# Patient Record
Sex: MALE | Race: WHITE | NOT HISPANIC OR LATINO | Employment: OTHER | URBAN - NONMETROPOLITAN AREA
[De-identification: names, ages, dates, MRNs, and addresses within clinical notes are randomized per-mention and may not be internally consistent; named-entity substitution may affect disease eponyms.]

---

## 2019-01-09 ENCOUNTER — HOSPITAL ENCOUNTER (OUTPATIENT)
Dept: PHYSICAL THERAPY | Facility: HOSPITAL | Age: 79
Setting detail: THERAPIES SERIES
End: 2019-01-09
Attending: PHYSICIAN ASSISTANT
Payer: COMMERCIAL

## 2019-01-09 PROCEDURE — 97140 MANUAL THERAPY 1/> REGIONS: CPT | Mod: GP

## 2019-01-09 PROCEDURE — 97110 THERAPEUTIC EXERCISES: CPT | Mod: GP

## 2019-01-09 PROCEDURE — 97162 PT EVAL MOD COMPLEX 30 MIN: CPT | Mod: GP

## 2019-01-09 NOTE — PROGRESS NOTES
Initial Physical Therapy Evaluations       Name: Blake Burgos MRN# [id number]   Age: 78 year old YOB: 1940     Date of Consultation: January 9, 2019  Primary care provider: Dominic Head    Referring Physician: Dominic Head  Orders: Eval and Treat  Medical Diagnosis: Cervicalgia  Onset of Illness/Injury: n/a    Reason for PT Visit: Patient is an 77 y/o male who presents with neck pain and bilateral radicular arm/hand pain. Has a long history of headache like symptoms as well, which he feels has been worsening along with his neck pain over the past few months. States that in 1974 - fell from roughly 28 feet and sustained multiple injuries along with a head injury. States that pain started in 1983 in his L arm. 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 Recenty had an x ray taken which showed degenerate changes through his cervical spine. Has tried to use a cervical collar and heat for the neck pain, which hasn't really helped. Only prior surgical history was on his wrist in 1974. States that neck pain level is the worst in the morning. Denies ever using traction for neck pain previously. States that he has tried acupuncture, therapy, and injections for his neck in the past.  Prior Level of Function: Has long history of neck pain and bilateral UE tingling   Pain: 4-10/10     Community Support/Living Environment/Employment History: Patient is retired     Patient/Family Goal: Reduce pain, improve function    Fall Screen:   Have you fallen 2 or more times in the last year? No  Have you fallen and had an injury in the last year? No  Timed up & go:   Is patient a fall risk? No    Past Medical History:   No past medical history on file.    Past Surgical History:  No past surgical history on file.    Medications:   No current outpatient medications on file.     No current facility-administered medications for this encounter.        Imaging: Recent x ray taken of cervical spine on  12/7/18:    Advanced degenerative disc disease C5-6 and and C6-7. Mild degenerative disc disease at C4-5. Multilevel moderate to advanced neural formainal narrowing on the L, greatest at C4-C5    Musculoskeletal Findings:     OBJECTIVE   Observation: Patient presents to department in no acute distress.     Palpation: Has tenderness with palpation to bilateral cervical paraspinals and sub occipital musculature    Posture: Forward head, protracted shoulders   Sitting Posture: Poor  Standing Posture: Fair    Neurological Assessment:   Reflexes:   Biceps and triceps reflexes +2    Myotomes:   Right upper extremity: within functional limits and symmetrical   Left upper extremity: within functional limits and symmetrical       Range of Motion/Strength:   Cervical Spine Range of Motion   Active Motion   Flexion 46    Extension 18  - pain reproduction   Side Bend Right 10    Side Bend Left 10    Rotation Right 52    Rotation Left 67    Right upper extremity range of motion: 123 degrees scaption, IR reach and ER reach test to WNLs  Left upper extremity range of motion: 121 degrees scaption, IR reach and ER reach test to WNLs    Right upper extremity strength: 3+/5 scaption, 3+/5 abduction, 4/5 IR, 3+/5 ER  Left upper extremity strength: 3+/5 scaption, 3+/5 abduction, 4/5 IR, 3+/5 ER  Deep Neck Flexor Endurance Test: 13 seconds prior to substitution    Special Tests:   Cervical Spine Tests  Spurling: R  (-) L (-)  Passive Intervertebral Movement Testing:  Mild tenderness with light central and unilateral PA testing  Median Nerve Tension Test:   Radial Nerve Tension Test:   Ulnar Nerve Tension Test:  AO mobility: Limited passive flexion mobility. Upper cervical spine in extended position       Outcome Measures:   48.89% NDI    Prognosis/Plan of Care: Fair  Appropriate for Physical Therapy Intervention: Yes     GOALS:   Short-term goals:  To be achieved in 2-3 weeks:    Instruct in home program  1.) Patient will be independent  with a short-term home exercise program.  2.) Patient will understand and demonstrate improved posture and techniques such that patient places less strain over cervical spine.  3.) Patient will report a 25% or greater improvement in symptoms in order to allow for increased comfort with activities of daily living.        Long-term goals:  To be achieved in 8 weeks:    Self management of symptoms.  Pain free activities of daily living.  1.) Improve score on Neck Disability Index by 50% to correlate with clinically significant change.  2.) Patient will report a 50% or greater improvement in symptoms in order to allow for increased comfort with activities of daily living.       Discharge goals: Patient will be independent with a home program for self-management of symptoms.      Patient presents today with signs and symptoms consistent of chronic mechanical neck pain with likely facet joint irritation. Therapy today consisted of evaluation, patient education, and therapeutic exercise. Patient may benefit from mechanical traction at next visit. Patient would benefit from continued PT sessions addressing overall pain and dysfunction with use of appropriate interventions.    Treatment plan:  1.) Modalities including ultrasound, electrical stimulation, and mechanical traction as needed for pain management and increasing tissue extensibility  2.) Manual therapy to include cervical and thoracic spine joint and soft tissue mobilization for improved mobility and decreased pain  3.) Therapeutic exercise to consist of cervical stabilization and scapular strengthening and range of motion activities    Treatment Rendered/Intervention:   Evaluation completed as described above followed by discussion of exam findings and plan of care.    Therapeutic exercise: Patient instructed in and demonstrated proper performance of home exercise program consisting of:  Supine chin tuck, seated chin tuck, and scap squeezes    Educated in posture  principles and neutral spine positioning. Patient was instructed in home use of heat and/or ice for pain management      Clinical Impressions:  Criteria for Skilled Therapeutic Intervention Met: Yes  PT Diagnosis: chronic neck pain  Influenced by the following impairments: pain, weakness, postural dysfunction  Functional limitations due to impairment: pain with daily movement, activity, and lifting  Clinical presentation: Stable/Uncomplicated  Clinical presentation rationale: Clinical judgement  Clinical Decision making (complexity): Moderate Complexity  Predicted Duration of Therapy Intervention (days/wks): 8 weeks  Risks and Benefits of therapy have been explained: Yes  Patient, Family & other staff in agreement with plan of care: Yes  Comments:       Total Evaluation Time: 55 minutes

## 2019-01-11 ENCOUNTER — HOSPITAL ENCOUNTER (OUTPATIENT)
Dept: PHYSICAL THERAPY | Facility: HOSPITAL | Age: 79
Setting detail: THERAPIES SERIES
End: 2019-01-11
Attending: PHYSICIAN ASSISTANT
Payer: COMMERCIAL

## 2019-01-11 PROCEDURE — 97140 MANUAL THERAPY 1/> REGIONS: CPT | Mod: GP

## 2019-01-11 PROCEDURE — 97110 THERAPEUTIC EXERCISES: CPT | Mod: GP

## 2019-01-15 ENCOUNTER — HOSPITAL ENCOUNTER (OUTPATIENT)
Dept: PHYSICAL THERAPY | Facility: HOSPITAL | Age: 79
Setting detail: THERAPIES SERIES
End: 2019-01-15
Attending: PHYSICIAN ASSISTANT
Payer: COMMERCIAL

## 2019-01-15 PROCEDURE — 97110 THERAPEUTIC EXERCISES: CPT | Mod: GP

## 2019-01-15 PROCEDURE — 97140 MANUAL THERAPY 1/> REGIONS: CPT | Mod: GP

## 2019-01-22 ENCOUNTER — HOSPITAL ENCOUNTER (OUTPATIENT)
Dept: PHYSICAL THERAPY | Facility: HOSPITAL | Age: 79
Setting detail: THERAPIES SERIES
End: 2019-01-22
Attending: PHYSICIAN ASSISTANT
Payer: COMMERCIAL

## 2019-01-22 PROCEDURE — 97140 MANUAL THERAPY 1/> REGIONS: CPT | Mod: GP

## 2019-01-22 PROCEDURE — 97110 THERAPEUTIC EXERCISES: CPT | Mod: GP

## 2019-02-05 ENCOUNTER — HOSPITAL ENCOUNTER (OUTPATIENT)
Dept: PHYSICAL THERAPY | Facility: HOSPITAL | Age: 79
Setting detail: THERAPIES SERIES
End: 2019-02-05
Attending: PHYSICIAN ASSISTANT
Payer: COMMERCIAL

## 2019-02-05 PROCEDURE — 97110 THERAPEUTIC EXERCISES: CPT | Mod: GP

## 2019-02-05 PROCEDURE — 97140 MANUAL THERAPY 1/> REGIONS: CPT | Mod: GP

## 2019-02-11 ENCOUNTER — HOSPITAL ENCOUNTER (OUTPATIENT)
Dept: PHYSICAL THERAPY | Facility: HOSPITAL | Age: 79
Setting detail: THERAPIES SERIES
End: 2019-02-11
Attending: PHYSICIAN ASSISTANT
Payer: COMMERCIAL

## 2019-02-11 PROCEDURE — 97140 MANUAL THERAPY 1/> REGIONS: CPT | Mod: GP

## 2019-02-11 PROCEDURE — 97110 THERAPEUTIC EXERCISES: CPT | Mod: GP

## 2019-05-09 ENCOUNTER — HOSPITAL ENCOUNTER (OUTPATIENT)
Dept: MRI IMAGING | Facility: HOSPITAL | Age: 79
Discharge: HOME | End: 2019-05-09
Attending: PHYSICIAN ASSISTANT | Admitting: PHYSICIAN ASSISTANT
Payer: COMMERCIAL

## 2019-05-09 ENCOUNTER — HOSPITAL ENCOUNTER (OUTPATIENT)
Dept: MRI IMAGING | Facility: HOSPITAL | Age: 79
End: 2019-05-09
Attending: PHYSICIAN ASSISTANT
Payer: COMMERCIAL

## 2019-05-09 DIAGNOSIS — M54.50 LOW BACK PAIN: ICD-10-CM

## 2019-05-09 DIAGNOSIS — M54.2 CERVICALGIA: ICD-10-CM

## 2019-05-09 PROCEDURE — 72148 MRI LUMBAR SPINE W/O DYE: CPT | Mod: TC

## 2019-05-09 PROCEDURE — 72141 MRI NECK SPINE W/O DYE: CPT | Mod: TC

## 2019-08-12 ENCOUNTER — HOSPITAL ENCOUNTER (OUTPATIENT)
Dept: MRI IMAGING | Facility: OTHER | Age: 79
Discharge: HOME | End: 2019-08-12
Attending: NEUROLOGICAL SURGERY | Admitting: FAMILY MEDICINE
Payer: COMMERCIAL

## 2019-08-12 DIAGNOSIS — R51.9 CHRONIC HEADACHE: ICD-10-CM

## 2019-08-12 DIAGNOSIS — G89.29 CHRONIC HEADACHE: ICD-10-CM

## 2019-08-12 PROCEDURE — 70551 MRI BRAIN STEM W/O DYE: CPT

## 2020-08-26 ENCOUNTER — HOSPITAL ENCOUNTER (OUTPATIENT)
Dept: ULTRASOUND IMAGING | Facility: OTHER | Age: 80
Discharge: HOME | End: 2020-08-26
Attending: PHYSICIAN ASSISTANT | Admitting: FAMILY MEDICINE
Payer: COMMERCIAL

## 2020-08-26 DIAGNOSIS — I65.29 STENOSIS OF CAROTID ARTERY, UNSPECIFIED LATERALITY: ICD-10-CM

## 2020-08-26 PROCEDURE — 93880 EXTRACRANIAL BILAT STUDY: CPT

## 2022-01-01 ENCOUNTER — MYC MEDICAL ADVICE (OUTPATIENT)
Dept: INTERNAL MEDICINE | Facility: OTHER | Age: 82
End: 2022-01-01
Payer: MEDICARE

## 2022-01-01 ENCOUNTER — ALLIED HEALTH/NURSE VISIT (OUTPATIENT)
Dept: FAMILY MEDICINE | Facility: OTHER | Age: 82
End: 2022-01-01
Attending: INTERNAL MEDICINE
Payer: COMMERCIAL

## 2022-01-01 ENCOUNTER — HEALTH MAINTENANCE LETTER (OUTPATIENT)
Age: 82
End: 2022-01-01

## 2022-01-01 ENCOUNTER — OFFICE VISIT (OUTPATIENT)
Dept: INTERNAL MEDICINE | Facility: OTHER | Age: 82
End: 2022-01-01
Attending: INTERNAL MEDICINE
Payer: COMMERCIAL

## 2022-01-01 ENCOUNTER — APPOINTMENT (OUTPATIENT)
Dept: CT IMAGING | Facility: OTHER | Age: 82
End: 2022-01-01
Attending: FAMILY MEDICINE
Payer: MEDICARE

## 2022-01-01 ENCOUNTER — TRANSFERRED RECORDS (OUTPATIENT)
Dept: HEALTH INFORMATION MANAGEMENT | Facility: OTHER | Age: 82
End: 2022-01-01
Payer: MEDICARE

## 2022-01-01 ENCOUNTER — HOSPITAL ENCOUNTER (OUTPATIENT)
Dept: RESPIRATORY THERAPY | Facility: OTHER | Age: 82
Discharge: HOME | End: 2022-03-07
Attending: INTERNAL MEDICINE | Admitting: INTERNAL MEDICINE
Payer: COMMERCIAL

## 2022-01-01 ENCOUNTER — APPOINTMENT (OUTPATIENT)
Dept: GENERAL RADIOLOGY | Facility: OTHER | Age: 82
End: 2022-01-01
Attending: FAMILY MEDICINE
Payer: MEDICARE

## 2022-01-01 ENCOUNTER — HOSPITAL ENCOUNTER (OUTPATIENT)
Dept: CT IMAGING | Facility: OTHER | Age: 82
End: 2022-03-07
Attending: INTERNAL MEDICINE
Payer: COMMERCIAL

## 2022-01-01 ENCOUNTER — OFFICE VISIT (OUTPATIENT)
Dept: INTERNAL MEDICINE | Facility: OTHER | Age: 82
End: 2022-01-01
Attending: INTERNAL MEDICINE
Payer: MEDICARE

## 2022-01-01 ENCOUNTER — HOSPITAL ENCOUNTER (OUTPATIENT)
Dept: RESPIRATORY THERAPY | Facility: OTHER | Age: 82
End: 2022-03-07
Attending: INTERNAL MEDICINE
Payer: COMMERCIAL

## 2022-01-01 ENCOUNTER — LAB (OUTPATIENT)
Dept: LAB | Facility: OTHER | Age: 82
End: 2022-01-01
Payer: COMMERCIAL

## 2022-01-01 ENCOUNTER — HOSPITAL ENCOUNTER (EMERGENCY)
Facility: OTHER | Age: 82
Discharge: ANOTHER HOSPITAL | End: 2022-05-30
Attending: FAMILY MEDICINE | Admitting: FAMILY MEDICINE
Payer: MEDICARE

## 2022-01-01 VITALS
HEART RATE: 101 BPM | OXYGEN SATURATION: 92 % | SYSTOLIC BLOOD PRESSURE: 138 MMHG | TEMPERATURE: 97.7 F | DIASTOLIC BLOOD PRESSURE: 88 MMHG | DIASTOLIC BLOOD PRESSURE: 78 MMHG | HEART RATE: 84 BPM | TEMPERATURE: 98.1 F | SYSTOLIC BLOOD PRESSURE: 162 MMHG | RESPIRATION RATE: 24 BRPM | WEIGHT: 126 LBS | WEIGHT: 127.6 LBS | OXYGEN SATURATION: 86 % | RESPIRATION RATE: 18 BRPM

## 2022-01-01 VITALS
TEMPERATURE: 97.4 F | DIASTOLIC BLOOD PRESSURE: 78 MMHG | HEART RATE: 93 BPM | WEIGHT: 128.6 LBS | OXYGEN SATURATION: 90 % | RESPIRATION RATE: 24 BRPM | SYSTOLIC BLOOD PRESSURE: 148 MMHG

## 2022-01-01 VITALS
RESPIRATION RATE: 17 BRPM | OXYGEN SATURATION: 100 % | DIASTOLIC BLOOD PRESSURE: 57 MMHG | TEMPERATURE: 94.82 F | SYSTOLIC BLOOD PRESSURE: 106 MMHG | HEART RATE: 83 BPM

## 2022-01-01 DIAGNOSIS — J44.9 CHRONIC OBSTRUCTIVE PULMONARY DISEASE, UNSPECIFIED COPD TYPE (H): ICD-10-CM

## 2022-01-01 DIAGNOSIS — D47.1 MYELOPROLIFERATIVE DISORDER (H): ICD-10-CM

## 2022-01-01 DIAGNOSIS — J43.9 PULMONARY EMPHYSEMA, UNSPECIFIED EMPHYSEMA TYPE (H): ICD-10-CM

## 2022-01-01 DIAGNOSIS — Z87.09 H/O ASBESTOSIS: ICD-10-CM

## 2022-01-01 DIAGNOSIS — J43.9 PULMONARY EMPHYSEMA, UNSPECIFIED EMPHYSEMA TYPE (H): Primary | ICD-10-CM

## 2022-01-01 DIAGNOSIS — N18.32 STAGE 3B CHRONIC KIDNEY DISEASE (H): ICD-10-CM

## 2022-01-01 DIAGNOSIS — I73.9 PERIPHERAL ARTERY DISEASE (H): ICD-10-CM

## 2022-01-01 DIAGNOSIS — I46.9 CARDIOPULMONARY ARREST (H): ICD-10-CM

## 2022-01-01 DIAGNOSIS — Z20.822 COVID-19 RULED OUT: Primary | ICD-10-CM

## 2022-01-01 DIAGNOSIS — R91.8 PULMONARY NODULES: ICD-10-CM

## 2022-01-01 DIAGNOSIS — R91.8 PULMONARY NODULES: Primary | ICD-10-CM

## 2022-01-01 DIAGNOSIS — E78.49 OTHER HYPERLIPIDEMIA: ICD-10-CM

## 2022-01-01 LAB
6 MIN WALK (FT): NORMAL
6 MIN WALK (M): NORMAL
ALBUMIN SERPL-MCNC: 3.7 G/DL (ref 3.5–5.7)
ALBUMIN UR-MCNC: 300 MG/DL
ALP SERPL-CCNC: 85 U/L (ref 34–104)
ALT SERPL W P-5'-P-CCNC: 10 U/L (ref 7–52)
AMMONIA PLAS-SCNC: 63 UMOL/L (ref 16–53)
ANION GAP SERPL CALCULATED.3IONS-SCNC: 14 MMOL/L (ref 3–14)
ANION GAP SERPL CALCULATED.3IONS-SCNC: 6 MMOL/L (ref 3–14)
APPEARANCE UR: ABNORMAL
APTT PPP: 35 SECONDS (ref 22–38)
AST SERPL W P-5'-P-CCNC: 14 U/L (ref 13–39)
ATRIAL RATE - MUSE: 77 BPM
BACTERIA UR CULT: NO GROWTH
BASE EXCESS BLDA CALC-SCNC: -11 MMOL/L (ref -9–1.8)
BASOPHILS # BLD AUTO: 0.1 10E3/UL (ref 0–0.2)
BASOPHILS # BLD AUTO: 0.1 10E3/UL (ref 0–0.2)
BASOPHILS NFR BLD AUTO: 1 %
BASOPHILS NFR BLD AUTO: 2 %
BILIRUB SERPL-MCNC: 0.4 MG/DL (ref 0.3–1)
BILIRUB UR QL STRIP: NEGATIVE
BUN SERPL-MCNC: 27 MG/DL (ref 7–25)
BUN SERPL-MCNC: 30 MG/DL (ref 7–25)
CALCIUM SERPL-MCNC: 7.9 MG/DL (ref 8.6–10.3)
CALCIUM SERPL-MCNC: 9.1 MG/DL (ref 8.6–10.3)
CHLORIDE BLD-SCNC: 106 MMOL/L (ref 98–107)
CHLORIDE BLD-SCNC: 107 MMOL/L (ref 98–107)
CO2 SERPL-SCNC: 20 MMOL/L (ref 21–31)
CO2 SERPL-SCNC: 27 MMOL/L (ref 21–31)
COLOR UR AUTO: YELLOW
CREAT SERPL-MCNC: 1.92 MG/DL (ref 0.7–1.3)
CREAT SERPL-MCNC: 2.06 MG/DL (ref 0.7–1.3)
D DIMER PPP FEU-MCNC: 7.77 UG/ML FEU (ref 0–0.5)
DIASTOLIC BLOOD PRESSURE - MUSE: NORMAL MMHG
EOSINOPHIL # BLD AUTO: 0.2 10E3/UL (ref 0–0.7)
EOSINOPHIL # BLD AUTO: 0.3 10E3/UL (ref 0–0.7)
EOSINOPHIL NFR BLD AUTO: 3 %
EOSINOPHIL NFR BLD AUTO: 4 %
ERYTHROCYTE [DISTWIDTH] IN BLOOD BY AUTOMATED COUNT: 15 % (ref 10–15)
ERYTHROCYTE [DISTWIDTH] IN BLOOD BY AUTOMATED COUNT: 16.4 % (ref 10–15)
ETHANOL SERPL-MCNC: <0.01 G/DL
FLUAV RNA SPEC QL NAA+PROBE: NEGATIVE
FLUBV RNA RESP QL NAA+PROBE: NEGATIVE
GFR SERPL CREATININE-BSD FRML MDRD: 31 ML/MIN/1.73M2
GFR SERPL CREATININE-BSD FRML MDRD: 34 ML/MIN/1.73M2
GLUCOSE BLD-MCNC: 217 MG/DL (ref 70–105)
GLUCOSE BLD-MCNC: 98 MG/DL (ref 70–105)
GLUCOSE UR STRIP-MCNC: 70 MG/DL
HCO3 BLD-SCNC: 18 MMOL/L (ref 21–28)
HCT VFR BLD AUTO: 40.9 % (ref 40–53)
HCT VFR BLD AUTO: 42.2 % (ref 40–53)
HGB BLD-MCNC: 12.2 G/DL (ref 13.3–17.7)
HGB BLD-MCNC: 13.6 G/DL (ref 13.3–17.7)
HGB UR QL STRIP: ABNORMAL
HYALINE CASTS: 6 /LPF
IMM GRANULOCYTES # BLD: 0 10E3/UL
IMM GRANULOCYTES # BLD: 0.3 10E3/UL
IMM GRANULOCYTES NFR BLD: 0 %
IMM GRANULOCYTES NFR BLD: 4 %
INR PPP: 1.18 (ref 0.85–1.15)
INTERPRETATION ECG - MUSE: NORMAL
KETONES UR STRIP-MCNC: NEGATIVE MG/DL
LACTATE SERPL-SCNC: 8.9 MMOL/L (ref 0.7–2)
LEUKOCYTE ESTERASE UR QL STRIP: NEGATIVE
LYMPHOCYTES # BLD AUTO: 0.8 10E3/UL (ref 0.8–5.3)
LYMPHOCYTES # BLD AUTO: 2.4 10E3/UL (ref 0.8–5.3)
LYMPHOCYTES NFR BLD AUTO: 14 %
LYMPHOCYTES NFR BLD AUTO: 39 %
MCH RBC QN AUTO: 33.2 PG (ref 26.5–33)
MCH RBC QN AUTO: 33.6 PG (ref 26.5–33)
MCHC RBC AUTO-ENTMCNC: 29.8 G/DL (ref 31.5–36.5)
MCHC RBC AUTO-ENTMCNC: 32.2 G/DL (ref 31.5–36.5)
MCV RBC AUTO: 104 FL (ref 78–100)
MCV RBC AUTO: 111 FL (ref 78–100)
MONOCYTES # BLD AUTO: 0.5 10E3/UL (ref 0–1.3)
MONOCYTES # BLD AUTO: 0.6 10E3/UL (ref 0–1.3)
MONOCYTES NFR BLD AUTO: 10 %
MONOCYTES NFR BLD AUTO: 9 %
MUCOUS THREADS #/AREA URNS LPF: PRESENT /LPF
NEUTROPHILS # BLD AUTO: 2.6 10E3/UL (ref 1.6–8.3)
NEUTROPHILS # BLD AUTO: 4.4 10E3/UL (ref 1.6–8.3)
NEUTROPHILS NFR BLD AUTO: 43 %
NEUTROPHILS NFR BLD AUTO: 71 %
NITRATE UR QL: NEGATIVE
NRBC # BLD AUTO: 0 10E3/UL
NRBC # BLD AUTO: 0 10E3/UL
NRBC BLD AUTO-RTO: 0 /100
NRBC BLD AUTO-RTO: 0 /100
O2/TOTAL GAS SETTING VFR VENT: 100 %
OXYHGB MFR BLD: 96 % (ref 92–100)
P AXIS - MUSE: 83 DEGREES
PCO2 BLD: 54 MM HG (ref 35–45)
PH BLD: 7.13 [PH] (ref 7.35–7.45)
PH UR STRIP: 6.5 [PH] (ref 5–9)
PLATELET # BLD AUTO: 210 10E3/UL (ref 150–450)
PLATELET # BLD AUTO: 360 10E3/UL (ref 150–450)
PO2 BLD: >488 MM HG (ref 80–105)
POTASSIUM BLD-SCNC: 4.9 MMOL/L (ref 3.5–5.1)
POTASSIUM BLD-SCNC: 5.4 MMOL/L (ref 3.5–5.1)
PR INTERVAL - MUSE: 158 MS
PROT SERPL-MCNC: 6.8 G/DL (ref 6.4–8.9)
QRS DURATION - MUSE: 136 MS
QT - MUSE: 508 MS
QTC - MUSE: 574 MS
R AXIS - MUSE: 97 DEGREES
RBC # BLD AUTO: 3.68 10E6/UL (ref 4.4–5.9)
RBC # BLD AUTO: 4.05 10E6/UL (ref 4.4–5.9)
RBC URINE: >182 /HPF
RSV RNA SPEC NAA+PROBE: NEGATIVE
SARS-COV-2 RNA RESP QL NAA+PROBE: NEGATIVE
SARS-COV-2 RNA RESP QL NAA+PROBE: NEGATIVE
SODIUM SERPL-SCNC: 140 MMOL/L (ref 134–144)
SODIUM SERPL-SCNC: 140 MMOL/L (ref 134–144)
SP GR UR STRIP: 1.02 (ref 1–1.03)
SQUAMOUS EPITHELIAL: 2 /HPF
SYSTOLIC BLOOD PRESSURE - MUSE: NORMAL MMHG
T AXIS - MUSE: 60 DEGREES
TROPONIN I SERPL-MCNC: 33.7 PG/ML (ref 0–34)
UROBILINOGEN UR STRIP-MCNC: NORMAL MG/DL
VENTRICULAR RATE- MUSE: 77 BPM
WBC # BLD AUTO: 6.1 10E3/UL (ref 4–11)
WBC # BLD AUTO: 6.2 10E3/UL (ref 4–11)
WBC URINE: 41 /HPF

## 2022-01-01 PROCEDURE — 99213 OFFICE O/P EST LOW 20 MIN: CPT | Performed by: INTERNAL MEDICINE

## 2022-01-01 PROCEDURE — 92950 HEART/LUNG RESUSCITATION CPR: CPT | Performed by: FAMILY MEDICINE

## 2022-01-01 PROCEDURE — 84484 ASSAY OF TROPONIN QUANT: CPT | Performed by: FAMILY MEDICINE

## 2022-01-01 PROCEDURE — 250N000009 HC RX 250: Performed by: FAMILY MEDICINE

## 2022-01-01 PROCEDURE — C9803 HOPD COVID-19 SPEC COLLECT: HCPCS

## 2022-01-01 PROCEDURE — 82140 ASSAY OF AMMONIA: CPT | Performed by: FAMILY MEDICINE

## 2022-01-01 PROCEDURE — 36415 COLL VENOUS BLD VENIPUNCTURE: CPT | Performed by: FAMILY MEDICINE

## 2022-01-01 PROCEDURE — 999N000157 HC STATISTIC RCP TIME EA 10 MIN

## 2022-01-01 PROCEDURE — 51702 INSERT TEMP BLADDER CATH: CPT | Mod: XU | Performed by: FAMILY MEDICINE

## 2022-01-01 PROCEDURE — 94618 PULMONARY STRESS TESTING: CPT

## 2022-01-01 PROCEDURE — 94060 EVALUATION OF WHEEZING: CPT

## 2022-01-01 PROCEDURE — 36600 WITHDRAWAL OF ARTERIAL BLOOD: CPT | Performed by: FAMILY MEDICINE

## 2022-01-01 PROCEDURE — 94729 DIFFUSING CAPACITY: CPT | Mod: 26 | Performed by: INTERNAL MEDICINE

## 2022-01-01 PROCEDURE — 85379 FIBRIN DEGRADATION QUANT: CPT | Performed by: FAMILY MEDICINE

## 2022-01-01 PROCEDURE — 81001 URINALYSIS AUTO W/SCOPE: CPT | Performed by: FAMILY MEDICINE

## 2022-01-01 PROCEDURE — 999N000158 HC STATISTIC RCP TIME ED VENT EA 10 MIN

## 2022-01-01 PROCEDURE — 94002 VENT MGMT INPAT INIT DAY: CPT

## 2022-01-01 PROCEDURE — 999N000105 HC STATISTIC NO DOCUMENTATION TO SUPPORT CHARGE

## 2022-01-01 PROCEDURE — 99285 EMERGENCY DEPT VISIT HI MDM: CPT | Mod: 25 | Performed by: FAMILY MEDICINE

## 2022-01-01 PROCEDURE — 258N000003 HC RX IP 258 OP 636: Performed by: FAMILY MEDICINE

## 2022-01-01 PROCEDURE — 94618 PULMONARY STRESS TESTING: CPT | Mod: 26 | Performed by: INTERNAL MEDICINE

## 2022-01-01 PROCEDURE — 99291 CRITICAL CARE FIRST HOUR: CPT | Mod: CS | Performed by: FAMILY MEDICINE

## 2022-01-01 PROCEDURE — 80048 BASIC METABOLIC PNL TOTAL CA: CPT | Performed by: FAMILY MEDICINE

## 2022-01-01 PROCEDURE — C9803 HOPD COVID-19 SPEC COLLECT: HCPCS | Performed by: FAMILY MEDICINE

## 2022-01-01 PROCEDURE — 87086 URINE CULTURE/COLONY COUNT: CPT | Performed by: FAMILY MEDICINE

## 2022-01-01 PROCEDURE — 83605 ASSAY OF LACTIC ACID: CPT | Performed by: FAMILY MEDICINE

## 2022-01-01 PROCEDURE — 87637 SARSCOV2&INF A&B&RSV AMP PRB: CPT | Performed by: FAMILY MEDICINE

## 2022-01-01 PROCEDURE — G1004 CDSM NDSC: HCPCS

## 2022-01-01 PROCEDURE — 72125 CT NECK SPINE W/O DYE: CPT | Mod: MG

## 2022-01-01 PROCEDURE — 85610 PROTHROMBIN TIME: CPT | Performed by: FAMILY MEDICINE

## 2022-01-01 PROCEDURE — 85730 THROMBOPLASTIN TIME PARTIAL: CPT | Mod: GZ | Performed by: FAMILY MEDICINE

## 2022-01-01 PROCEDURE — 85025 COMPLETE CBC W/AUTO DIFF WBC: CPT | Mod: ZL

## 2022-01-01 PROCEDURE — 94729 DIFFUSING CAPACITY: CPT | Mod: 26

## 2022-01-01 PROCEDURE — 82805 BLOOD GASES W/O2 SATURATION: CPT | Performed by: FAMILY MEDICINE

## 2022-01-01 PROCEDURE — 94060 EVALUATION OF WHEEZING: CPT | Mod: 26 | Performed by: INTERNAL MEDICINE

## 2022-01-01 PROCEDURE — 94200 LUNG FUNCTION TEST (MBC/MVV): CPT

## 2022-01-01 PROCEDURE — 85025 COMPLETE CBC W/AUTO DIFF WBC: CPT | Performed by: FAMILY MEDICINE

## 2022-01-01 PROCEDURE — 93005 ELECTROCARDIOGRAM TRACING: CPT | Performed by: FAMILY MEDICINE

## 2022-01-01 PROCEDURE — 71250 CT THORAX DX C-: CPT

## 2022-01-01 PROCEDURE — 96365 THER/PROPH/DIAG IV INF INIT: CPT | Mod: XU | Performed by: FAMILY MEDICINE

## 2022-01-01 PROCEDURE — 36415 COLL VENOUS BLD VENIPUNCTURE: CPT | Mod: ZL

## 2022-01-01 PROCEDURE — 999N000065 XR CHEST PORT 1 VIEW

## 2022-01-01 PROCEDURE — U0003 INFECTIOUS AGENT DETECTION BY NUCLEIC ACID (DNA OR RNA); SEVERE ACUTE RESPIRATORY SYNDROME CORONAVIRUS 2 (SARS-COV-2) (CORONAVIRUS DISEASE [COVID-19]), AMPLIFIED PROBE TECHNIQUE, MAKING USE OF HIGH THROUGHPUT TECHNOLOGIES AS DESCRIBED BY CMS-2020-01-R: HCPCS | Mod: ZL

## 2022-01-01 PROCEDURE — 250N000009 HC RX 250

## 2022-01-01 PROCEDURE — 82077 ASSAY SPEC XCP UR&BREATH IA: CPT | Performed by: FAMILY MEDICINE

## 2022-01-01 PROCEDURE — 93010 ELECTROCARDIOGRAM REPORT: CPT | Performed by: INTERNAL MEDICINE

## 2022-01-01 PROCEDURE — 71250 CT THORAX DX C-: CPT | Mod: MG

## 2022-01-01 PROCEDURE — 80051 ELECTROLYTE PANEL: CPT | Mod: ZL

## 2022-01-01 PROCEDURE — 250N000011 HC RX IP 250 OP 636: Performed by: FAMILY MEDICINE

## 2022-01-01 PROCEDURE — G0463 HOSPITAL OUTPT CLINIC VISIT: HCPCS

## 2022-01-01 PROCEDURE — 99204 OFFICE O/P NEW MOD 45 MIN: CPT | Performed by: INTERNAL MEDICINE

## 2022-01-01 RX ORDER — ALBUTEROL SULFATE 0.83 MG/ML
2.5 SOLUTION RESPIRATORY (INHALATION)
Status: COMPLETED | OUTPATIENT
Start: 2022-01-01 | End: 2022-01-01

## 2022-01-01 RX ORDER — ALBUTEROL SULFATE 90 UG/1
2 AEROSOL, METERED RESPIRATORY (INHALATION) EVERY 6 HOURS
Qty: 18 G | COMMUNITY
Start: 2022-01-01

## 2022-01-01 RX ORDER — HYDROXYUREA 500 MG/1
CAPSULE ORAL
COMMUNITY
Start: 2022-01-01

## 2022-01-01 RX ORDER — TAMSULOSIN HYDROCHLORIDE 0.4 MG/1
0.4 CAPSULE ORAL DAILY
COMMUNITY
Start: 2022-01-01

## 2022-01-01 RX ORDER — METOPROLOL TARTRATE 25 MG/1
12.5 TABLET, FILM COATED ORAL 2 TIMES DAILY
COMMUNITY
Start: 2022-01-01

## 2022-01-01 RX ORDER — ASPIRIN 325 MG
325 TABLET, DELAYED RELEASE (ENTERIC COATED) ORAL DAILY
COMMUNITY
Start: 2022-01-01

## 2022-01-01 RX ORDER — TIOTROPIUM BROMIDE 18 UG/1
18 CAPSULE ORAL; RESPIRATORY (INHALATION) DAILY
COMMUNITY
Start: 2022-01-01

## 2022-01-01 RX ORDER — ATORVASTATIN CALCIUM 80 MG/1
80 TABLET, FILM COATED ORAL DAILY
COMMUNITY
Start: 2022-01-01

## 2022-01-01 RX ADMIN — ALBUTEROL SULFATE 2.5 MG: 2.5 SOLUTION RESPIRATORY (INHALATION) at 15:22

## 2022-01-01 RX ADMIN — EPINEPHRINE 0.03 MCG/KG/MIN: 1 INJECTION INTRAMUSCULAR; INTRAVENOUS; SUBCUTANEOUS at 10:47

## 2022-01-01 RX ADMIN — Medication 80 MG: at 10:56

## 2022-01-01 RX ADMIN — ROCURONIUM BROMIDE 50 MG: 50 INJECTION, SOLUTION INTRAVENOUS at 10:56

## 2022-01-01 ASSESSMENT — PAIN SCALES - GENERAL
PAINLEVEL: NO PAIN (0)

## 2022-01-01 ASSESSMENT — ENCOUNTER SYMPTOMS
EYES NEGATIVE: 1
HEMATOLOGIC/LYMPHATIC NEGATIVE: 1
CONSTITUTIONAL NEGATIVE: 1
ALLERGIC/IMMUNOLOGIC NEGATIVE: 1
ENDOCRINE NEGATIVE: 1
ALLERGIC/IMMUNOLOGIC NEGATIVE: 1
CONSTITUTIONAL NEGATIVE: 1
EYES NEGATIVE: 1
ALLERGIC/IMMUNOLOGIC NEGATIVE: 1
ENDOCRINE NEGATIVE: 1
CONSTITUTIONAL NEGATIVE: 1
ENDOCRINE NEGATIVE: 1

## 2022-02-21 PROBLEM — I73.9 PERIPHERAL ARTERY DISEASE (H): Status: ACTIVE | Noted: 2022-01-01

## 2022-02-21 PROBLEM — J44.9 COPD (CHRONIC OBSTRUCTIVE PULMONARY DISEASE) (H): Status: ACTIVE | Noted: 2022-01-01

## 2022-02-21 PROBLEM — R39.9 LOWER URINARY TRACT SYMPTOMS (LUTS): Status: ACTIVE | Noted: 2022-01-01

## 2022-02-21 PROBLEM — D47.1 MYELOPROLIFERATIVE DISORDER (H): Status: ACTIVE | Noted: 2022-01-01

## 2022-02-21 PROBLEM — N18.32 STAGE 3B CHRONIC KIDNEY DISEASE (H): Status: ACTIVE | Noted: 2022-01-01

## 2022-02-21 PROBLEM — Z87.09 H/O ASBESTOSIS: Status: ACTIVE | Noted: 2022-01-01

## 2022-02-21 PROBLEM — E78.49 OTHER HYPERLIPIDEMIA: Status: ACTIVE | Noted: 2022-01-01

## 2022-02-21 NOTE — PROGRESS NOTES
Chief Complaint:  Establish care.    HPI: This patient is here today to establish care.  He has an extensive past medical history and I took some time today to update that.  His biggest problem is related to smoking history.  He likely has severe COPD.  He also has a history of asbestos exposure and asbestosis on previous imaging.  He complains of being short of breath with doing pretty much anything.  Sometimes his oxygen saturations are low at home, especially when he has a COPD exacerbation.  About a month ago he had a COPD exacerbation and was treated with prednisone and doxycycline with good results.    I reconciled his medications today and noted them on his medication list.  He is taking his Spiriva twice daily and his Wixela once daily.  I told him that he needed to change those dosing schedules.  Otherwise his medications seem to be acceptable.    [facility]  [facility] for him to get [facility] so apparently they are approving him to come here for further evaluation and care.  As mentioned, medication list is updated.  Past medical history, past surgical history, family history and social histories are reviewed and updated.  He does have a history of a myeloproliferative disorder for which he takes Hydrea, the exact diagnosis is unclear as he is uncertain.    He is accompanied today by his daughter Sima.    Past Medical History:   Diagnosis Date     Bilateral carotid artery stenosis     50%     Chronic neck pain      COPD (chronic obstructive pulmonary disease) (H)      H/O asbestosis      Lower urinary tract symptoms (LUTS)      Myeloproliferative disorder (H)      Other hyperlipidemia      Peripheral artery disease (H)      Polycythemia      Stage 3b chronic kidney disease (H)        Past Surgical History:   Procedure Laterality Date     BYPASS GRAFT Left      HERNIA REPAIR Left      ORIF WRIST FRACTURE Right        Allergies   Allergen Reactions     Nortriptyline Unknown     Oxycodone  Itching       Current Outpatient Medications   Medication Sig Dispense Refill     albuterol (PROAIR HFA/PROVENTIL HFA/VENTOLIN HFA) 108 (90 Base) MCG/ACT inhaler Inhale 2 puffs into the lungs every 6 hours 18 g      aspirin (ASA) 325 MG EC tablet Take 1 tablet (325 mg) by mouth daily       atorvastatin (LIPITOR) 80 MG tablet Take 1 tablet (80 mg) by mouth daily       fluticasone-salmeterol (ADVAIR) 250-50 MCG/DOSE inhaler Inhale 1 puff into the lungs every 12 hours       hydroxyurea (HYDREA) 500 MG capsule        metoprolol tartrate (LOPRESSOR) 25 MG tablet Take 0.5 tablets (12.5 mg) by mouth 2 times daily       tamsulosin (FLOMAX) 0.4 MG capsule Take 1 capsule (0.4 mg) by mouth daily       tiotropium (SPIRIVA) 18 MCG inhaled capsule Inhale 1 capsule (18 mcg) into the lungs daily         Social History     Socioeconomic History     Marital status:      Spouse name: Not on file     Number of children: Not on file     Years of education: Not on file     Highest education level: Not on file   Occupational History     Not on file   Tobacco Use     Smoking status: Current Every Day Smoker     Packs/day: 0.50     Smokeless tobacco: Never Used   Substance and Sexual Activity     Alcohol use: Not Currently     Drug use: Never     Sexual activity: Not on file   Other Topics Concern     Not on file   Social History Narrative    , lives near [facility].  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     Social Determinants of Health     Financial Resource Strain: Not on file   Food Insecurity: Not on file   Transportation Needs: Not on file   Physical Activity: Not on file   Stress: Not on file   Social Connections: Not on file   Intimate Partner Violence: Not on file   Housing Stability: Not on file       Review of Systems   Constitutional: Negative.    Endocrine: Negative.    Skin: Negative.    Allergic/Immunologic: Negative.    Hematological: Negative.        Physical Exam  Vitals reviewed.    Constitutional:       General: He is not in acute distress.     Appearance: Normal appearance. He is not ill-appearing, toxic-appearing or diaphoretic.      Comments: Somewhat frail   Neck:      Vascular: Carotid bruit present.   Cardiovascular:      Rate and Rhythm: Normal rate and regular rhythm.      Heart sounds: Normal heart sounds.   Pulmonary:      Breath sounds: Decreased breath sounds present. No wheezing, rhonchi or rales.      Comments: Mildly dyspneic at rest  Abdominal:      General: Abdomen is flat. There is no distension.      Palpations: Abdomen is soft.      Tenderness: There is no abdominal tenderness.   Musculoskeletal:      Cervical back: Normal range of motion.      Right lower leg: No edema.      Left lower leg: No edema.   Lymphadenopathy:      Cervical: No cervical adenopathy.   Skin:     General: Skin is warm and dry.   Neurological:      Mental Status: He is alert.         Assessment:      ICD-10-CM    1. Pulmonary emphysema, unspecified emphysema type (H)  J43.9 CT Chest w/o Contrast     Pulmonary Function Test   2. Other hyperlipidemia  E78.49    3. Peripheral artery disease (H)  I73.9    4. Stage 3b chronic kidney disease (H)  N18.32    5. H/O asbestosis  Z87.09        Plan: I think the patient is currently treated appropriately for his chronic diseases.  Could consider an ACE inhibitor but he has some baseline hyperkalemia on recent lab that might preclude this.  No changes at the present time.  Lab was not indicated as mentioned, he just had it done a month or so ago and that was reviewed.  I did encourage him to take his inhalers correctly.  I think his biggest problem is related to his significant COPD.  We will quantify this further with spirometry and because of his smoking history and asbestosis exposure, CT scanning of the chest is also indicated.  I will let him know the results of that.  We will proceed as indicated with further treatment, evaluation, etc. depending on the  results and his clinical course.  I did encourage him to get seen immediately should he have any COPD exacerbations.  Of course I strongly encouraged him to quit smoking.    The patient had a 6-minute walk test.  His O2 sat dropped and he required oxygen.  With 3 L he was able to maintain 89% or greater.  Oxygen is therefore prescribed.

## 2022-02-21 NOTE — NURSING NOTE
Chief Complaint   Patient presents with     Shortness of Breath       Initial /78   Pulse 84   Temp 98.1  F (36.7  C) (Tympanic)   Resp 24   Wt 57.9 kg (127 lb 9.6 oz)   SpO2 92%  There is no height or weight on file to calculate BMI.  FOOD SECURITY SCREENING QUESTIONS  Hunger Vital Signs:  Within the past 12 months we worried whether our food would run out before we got money to buy more. Never  Within the past 12 months the food we bought just didn't last and we didn't have money to get more. Never        Barry Vincent, DIAMONDN

## 2022-03-03 NOTE — PROGRESS NOTES
Patient here for Covid Testing. Pre op pft 3/7/22 BELLA.    Sera Treadwell MA on 3/3/2022 at 10:19 AM

## 2022-03-07 NOTE — PROGRESS NOTES
Patient has been assessed for Home Oxygen needs. Oxygen readings:    *Pulse oximetry (SpO2) = 88% on room air at rest while awake.    *SpO2 improved to 95% on 1 liters/minute at rest.    *SpO2 = 88% on room air during activity/with exercise.    *SpO2 improved to 92% on 3liters/minute during activity/with exercise.    RT Ty on 3/7/2022 at 3:51 PM

## 2022-03-07 NOTE — PROGRESS NOTES
DME (Durable Medical Equipment) Orders and Documentation  Orders Placed This Encounter   Procedures     Oxygen Order      The patient was assessed and it was determined the patient is in need of the following listed DME Supplies/Equipment. Please complete supporting documentation below to demonstrate medical necessity.      Oxygen Use Documentation  I certify that this patient, Blake Burgos has been under my care and that I, or a nurse practitioner or physician's assistant working with me, had a face-to-face encounter that meets face-to-face encounter requirements with this patient on March 7, 2022.    Blake Burgos is now in a chronic stable state and continues to require supplemental oxygen due to continued oxygen desaturation.  This patient has been treated in part, or in whole for the following medical condition(s):  COPD J44.9  Treatments tried and failed or ruled out to treat hypoxemia include NA.  If portability is ordered, is the patient mobile within the home? yes

## 2022-03-10 PROBLEM — R91.8 PULMONARY NODULES: Status: ACTIVE | Noted: 2022-01-01

## 2022-03-10 NOTE — PROGRESS NOTES
Chief Complaint: Follow-up on tests.    HPI: See previous note.  He had a spirometry showing severe obstructive lung disease.  He had a 6-minute walk and was hypoxic and we did prescribe oxygen.  He has not gotten this yet as [facility]  We did a CT scan of his chest confirming COPD as well as atherosclerosis but he also has 2 pulmonary nodules, one in the right upper lobe which is definitely larger and concerning.    Past Medical History:   Diagnosis Date     Bilateral carotid artery stenosis     50%     Chronic neck pain      COPD (chronic obstructive pulmonary disease) (H)      H/O asbestosis      Lower urinary tract symptoms (LUTS)      Myeloproliferative disorder (H)      Other hyperlipidemia      Peripheral artery disease (H)      Polycythemia      Stage 3b chronic kidney disease (H)        Past Surgical History:   Procedure Laterality Date     BYPASS GRAFT Left      HERNIA REPAIR Left      ORIF WRIST FRACTURE Right        Allergies   Allergen Reactions     Nortriptyline Unknown     Oxycodone Itching       Current Outpatient Medications   Medication Sig Dispense Refill     albuterol (PROAIR HFA/PROVENTIL HFA/VENTOLIN HFA) 108 (90 Base) MCG/ACT inhaler Inhale 2 puffs into the lungs every 6 hours 18 g      aspirin (ASA) 325 MG EC tablet Take 1 tablet (325 mg) by mouth daily       atorvastatin (LIPITOR) 80 MG tablet Take 1 tablet (80 mg) by mouth daily       fluticasone-salmeterol (ADVAIR) 250-50 MCG/DOSE inhaler Inhale 1 puff into the lungs every 12 hours       hydroxyurea (HYDREA) 500 MG capsule        metoprolol tartrate (LOPRESSOR) 25 MG tablet Take 0.5 tablets (12.5 mg) by mouth 2 times daily       tamsulosin (FLOMAX) 0.4 MG capsule Take 1 capsule (0.4 mg) by mouth daily       tiotropium (SPIRIVA) 18 MCG inhaled capsule Inhale 1 capsule (18 mcg) into the lungs daily         Review of Systems   Constitutional: Negative.    Eyes: Negative.    Endocrine: Negative.     Allergic/Immunologic: Negative.        Physical Exam  Vitals reviewed.   Constitutional:       General: He is not in acute distress.     Appearance: Normal appearance. He is not ill-appearing, toxic-appearing or diaphoretic.   Neurological:      Mental Status: He is alert.         Assessment:        ICD-10-CM    1. Pulmonary emphysema, unspecified emphysema type (H)  J43.9    2. Pulmonary nodules  R91.8        Plan: Reviewed with the patient and daughter.  [facility]  He needs to be seen by pulmonary for the pulmonary nodule, [facility] whether they need to go through that system or whether we can send him to [facility].  They will let me know.   detailed exam

## 2022-03-10 NOTE — NURSING NOTE
Chief Complaint   Patient presents with     Follow Up       Initial BP (!) 148/78   Pulse 93   Temp 97.4  F (36.3  C) (Tympanic)   Resp 24   Wt 58.3 kg (128 lb 9.6 oz)   SpO2 90%  There is no height or weight on file to calculate BMI.  FOOD SECURITY SCREENING QUESTIONS  Hunger Vital Signs:  Within the past 12 months we worried whether our food would run out before we got money to buy more. Never  Within the past 12 months the food we bought just didn't last and we didn't have money to get more. Never        Barry Vincent, DIAMONDN

## 2022-05-11 NOTE — PROGRESS NOTES
DME (Durable Medical Equipment) Orders and Documentation  Orders Placed This Encounter   Procedures     Oxygen Order      The patient was assessed and it was determined the patient is in need of the following listed DME Supplies/Equipment. Please complete supporting documentation below to demonstrate medical necessity.      Oxygen Use Documentation  I certify that this patient, Blake Burgos has been under my care and that I, or a nurse practitioner or physician's assistant working with me, had a face-to-face encounter that meets face-to-face encounter requirements with this patient on May 11, 2022.    Blake Burgos is now in a chronic stable state and continues to require supplemental oxygen due to continued oxygen desaturation.  This patient has been treated in part, or in whole for the following medical condition(s):  Emphysema J43.9  Treatments tried and failed or ruled out to treat hypoxemia include antibiotics and inhalers.  If portability is ordered, is the patient mobile within the home? yes

## 2022-05-23 NOTE — PROGRESS NOTES
Chief Complaint:  COPD.    HPI: He is here today to get certified for oxygen at home.  He has severe COPD.  He is treated with Advair, Spiriva and short acting beta agonist as needed.  His oxygen saturations at home have been as low as 83%.  I had previously ordered oxygen for him but because his O2 sat study was not done at that time, he could not get the oxygen.  He is here today to get certified to have home oxygen.  He is mobile in the house.  He will be seeing pulmonary forthcoming.    Past Medical History:   Diagnosis Date     Bilateral carotid artery stenosis     50%     Chronic neck pain      COPD (chronic obstructive pulmonary disease) (H)      H/O asbestosis      Lower urinary tract symptoms (LUTS)      Myeloproliferative disorder (H)      Other hyperlipidemia      Peripheral artery disease (H)      Polycythemia      Stage 3b chronic kidney disease (H)        Past Surgical History:   Procedure Laterality Date     BYPASS GRAFT Left      HERNIA REPAIR Left      ORIF WRIST FRACTURE Right        Allergies   Allergen Reactions     Nortriptyline Unknown     Oxycodone Itching       Current Outpatient Medications   Medication Sig Dispense Refill     albuterol (PROAIR HFA/PROVENTIL HFA/VENTOLIN HFA) 108 (90 Base) MCG/ACT inhaler Inhale 2 puffs into the lungs every 6 hours 18 g      aspirin (ASA) 325 MG EC tablet Take 1 tablet (325 mg) by mouth daily       atorvastatin (LIPITOR) 80 MG tablet Take 1 tablet (80 mg) by mouth daily       fluticasone-salmeterol (ADVAIR) 250-50 MCG/DOSE inhaler Inhale 1 puff into the lungs every 12 hours       hydroxyurea (HYDREA) 500 MG capsule        metoprolol tartrate (LOPRESSOR) 25 MG tablet Take 0.5 tablets (12.5 mg) by mouth 2 times daily       tamsulosin (FLOMAX) 0.4 MG capsule Take 1 capsule (0.4 mg) by mouth daily       tiotropium (SPIRIVA) 18 MCG inhaled capsule Inhale 1 capsule (18 mcg) into the lungs daily         Review of Systems   Constitutional: Negative.    Eyes:  Negative.    Endocrine: Negative.    Allergic/Immunologic: Negative.        Physical Exam  Vitals and nursing note reviewed.   Constitutional:       General: He is not in acute distress.     Appearance: Normal appearance. He is not ill-appearing, toxic-appearing or diaphoretic.   Neurological:      Mental Status: He is alert.         Assessment:        ICD-10-CM    1. Pulmonary emphysema, unspecified emphysema type (H)  J43.9 Oxygen Order       Plan: His oxygen saturation today at rest on room air is 86%.  He qualifies for oxygen.  This was reordered for him at 2 L/min.  He will let me know if he has any difficulty obtaining this.  Follow-up will be as needed.    DME (Durable Medical Equipment) Orders and Documentation  Orders Placed This Encounter   Procedures     Oxygen Order      The patient was assessed and it was determined the patient is in need of the following listed DME Supplies/Equipment. Please complete supporting documentation below to demonstrate medical necessity.      Oxygen Use Documentation  I certify that this patient, Blake Burgos has been under my care and that I, or a nurse practitioner or physician's assistant working with me, had a face-to-face encounter that meets face-to-face encounter requirements with this patient on May 23, 2022.    Blake Burgos is now in a chronic stable state and continues to require supplemental oxygen due to continued oxygen desaturation.  This patient has been treated in part, or in whole for the following medical condition(s):  Emphysema J43.9  Treatments tried and failed or ruled out to treat hypoxemia include inhalers, steroids.  If portability is ordered, is the patient mobile within the home? yes

## 2022-05-23 NOTE — NURSING NOTE
Chief Complaint   Patient presents with     Shortness of Breath       Initial BP (!) 162/88   Pulse 101   Temp 97.7  F (36.5  C) (Tympanic)   Resp 18   Wt 57.2 kg (126 lb)   SpO2 (!) 86%  There is no height or weight on file to calculate BMI.  FOOD SECURITY SCREENING QUESTIONS  Hunger Vital Signs:  Within the past 12 months we worried whether our food would run out before we got money to buy more. Never  Within the past 12 months the food we bought just didn't last and we didn't have money to get more. Never        Barry Manzano, LPN

## 2022-05-30 NOTE — ED TRIAGE NOTES
Pt was found unresponsive and gasping for air at 1001. First responders started CRP at 1005. Pt was intubated with a 7.0 tube. Pt asystole on arrival

## 2022-05-30 NOTE — ED PROVIDER NOTES
History     Chief Complaint   Patient presents with     Cardiac Arrest     CODE BLUE: CARDIOPULMONARY ARREST    The history is provided by the EMS personnel and medical records.     Blake Burgos is a 82 year old male here by ambulance in full CP arrest. He had an unwitnessed collapse in the parking lot of the [employer] in [place]. He was unresponsive.    Per EMS: First Responders initiated CPR on scene. EMS arrived and intubated the patient, started two IV's, started the Matty for asystole (no shock) and gave a total of two doses of epinephrine and an amp of bicarb.  He arrived in the ED with the Matty running and he was being bagged. He has not had any purposeful movement during transport.     Her daughter and son in law, who came to the ED: he would accept CPR but if recovery is unlikely he would not want to live on a ventilator.  He is vaccinated, not boosted, against COVID. He is not on blood thinners.     He has a history of COPD, asbestosis (on albuterol, Advair, Spiriva), HTN (on metoprolol) and is taking 81 mg aspirin and Lipitor. I do not see blood thinners on his chart.     Allergies:  Allergies   Allergen Reactions     Nortriptyline Unknown     Oxycodone Itching       Problem List:    Patient Active Problem List    Diagnosis Date Noted     Pulmonary nodules 03/10/2022     Priority: Medium     COPD (chronic obstructive pulmonary disease) (H) 02/21/2022     Priority: Medium     Other hyperlipidemia 02/21/2022     Priority: Medium     Stage 3b chronic kidney disease (H) 02/21/2022     Priority: Medium     H/O asbestosis 02/21/2022     Priority: Medium     Lower urinary tract symptoms (LUTS) 02/21/2022     Priority: Medium     Peripheral artery disease (H) 02/21/2022     Priority: Medium     Myeloproliferative disorder (H) 02/21/2022     Priority: Medium        Past Medical History:    Past Medical History:   Diagnosis Date     Bilateral carotid artery stenosis      Chronic neck pain      COPD  (chronic obstructive pulmonary disease) (H)      H/O asbestosis      Lower urinary tract symptoms (LUTS)      Myeloproliferative disorder (H)      Other hyperlipidemia      Peripheral artery disease (H)      Polycythemia      Stage 3b chronic kidney disease (H)        Past Surgical History:    Past Surgical History:   Procedure Laterality Date     BYPASS GRAFT Left      HERNIA REPAIR Left      ORIF WRIST FRACTURE Right        Family History:    Family History   Problem Relation Age of Onset     Heart Disease Mother      Heart Disease Father      Ovarian Cancer Sister      Heart Disease Brother      Bladder Cancer Brother        Social History:  Marital Status:   [4]  Social History     Tobacco Use     Smoking status: Current Every Day Smoker     Packs/day: 0.25     Smokeless tobacco: Never Used   Substance Use Topics     Alcohol use: Not Currently     Drug use: Never        Medications:    albuterol (PROAIR HFA/PROVENTIL HFA/VENTOLIN HFA) 108 (90 Base) MCG/ACT inhaler  aspirin (ASA) 325 MG EC tablet  atorvastatin (LIPITOR) 80 MG tablet  fluticasone-salmeterol (ADVAIR) 250-50 MCG/DOSE inhaler  hydroxyurea (HYDREA) 500 MG capsule  metoprolol tartrate (LOPRESSOR) 25 MG tablet  tamsulosin (FLOMAX) 0.4 MG capsule  tiotropium (SPIRIVA) 18 MCG inhaled capsule          Review of Systems   Unable to perform ROS: Acuity of condition       Physical Exam   BP: 90/54  Pulse: 90  Temp: (!) 94.1  F (34.5  C)  Resp: 10  SpO2: 99 %      Physical Exam  Constitutional:       Appearance: He is ill-appearing.      Comments: Matty in place and running, intubated with 7-0 ETT, two IV's in place, IV fluids running.   HENT:      Head: Normocephalic and atraumatic.      Right Ear: External ear normal.      Left Ear: External ear normal.      Nose: Nose normal.      Mouth/Throat:      Comments: Intubated, no blood at the mouth  Cardiovascular:      Comments: Initially asystolic. After epinephrine doses #3 and #4 total we had ROSC at  10:39 AM.  POCUS showed good cardiac activity and he had good pulses at the carotid and femoral arteries.  Pulmonary:      Breath sounds: Wheezing and rales present.      Comments: He has a Matty lavon on his chest. He is bagging easily.  Abdominal:      General: Bowel sounds are normal. There is no distension.      Palpations: Abdomen is soft.      Comments: OG in place   Musculoskeletal:         General: No swelling.      Right lower leg: No edema.      Left lower leg: No edema.   Skin:     General: Skin is warm and dry.   Neurological:      Comments: Patient did move his head in the ED after ROSC, no movement of extremities, prior to rocuronium and ketamine.            EKG Interpretation:      Interpreted by Donnell Elaine MD  Time reviewed: 11:31 AM    Symptoms at time of EKG: CP arrest   Rhythm: normal sinus   Rate: Normal  Axis: Normal  Ectopy: none  Conduction: right bundle branch block (complete)  ST Segments/ T Waves: No ST-T wave changes and No acute ischemic changes  Q Waves: v1, II, III and aVf  Comparison to prior: No old EKG available    Clinical Impression: Sinus rhythm, RBBB, Q waves seen in II, III, aVF, V1, NPTC      Critical Care time:  was 65 minutes for this patient excluding procedures.    Results for orders placed or performed during the hospital encounter of 05/30/22 (from the past 24 hour(s))   CBC with platelets differential    Narrative    The following orders were created for panel order CBC with platelets differential.  Procedure                               Abnormality         Status                     ---------                               -----------         ------                     CBC with platelets and d...[[id number]]  Abnormal            Final result                 Please view results for these tests on the individual orders.   D dimer quantitative   Result Value Ref Range    D-Dimer Quantitative 7.77 (H) 0.00 - 0.50 ug/mL FEU    Narrative    This D-dimer assay is  intended for use in conjunction with a clinical pretest probability assessment model to exclude pulmonary embolism (PE) and deep venous thrombosis (DVT) in outpatients suspected of PE or DVT. The cut-off value is 0.50 ug/mL FEU.   INR   Result Value Ref Range    INR 1.18 (H) 0.85 - 1.15   Partial thromboplastin time   Result Value Ref Range    aPTT 35 22 - 38 Seconds   Basic metabolic panel   Result Value Ref Range    Sodium 140 134 - 144 mmol/L    Potassium 4.9 3.5 - 5.1 mmol/L    Chloride 106 98 - 107 mmol/L    Carbon Dioxide (CO2) 20 (L) 21 - 31 mmol/L    Anion Gap 14 3 - 14 mmol/L    Urea Nitrogen 27 (H) 7 - 25 mg/dL    Creatinine 1.92 (H) 0.70 - 1.30 mg/dL    Calcium 7.9 (L) 8.6 - 10.3 mg/dL    Glucose 217 (H) 70 - 105 mg/dL    GFR Estimate 34 (L) >60 mL/min/1.73m2   Lactic acid whole blood   Result Value Ref Range    Lactic Acid 8.9 (HH) 0.7 - 2.0 mmol/L   Troponin I   Result Value Ref Range    Troponin I 33.7 0.0 - 34.0 pg/mL   Ammonia   Result Value Ref Range    Ammonia 63 (H) 16 - 53 umol/L   Ethanol GH   Result Value Ref Range    Alcohol ethyl <0.01 <=0.01 g/dL   CBC with platelets and differential   Result Value Ref Range    WBC Count 6.2 4.0 - 11.0 10e3/uL    RBC Count 3.68 (L) 4.40 - 5.90 10e6/uL    Hemoglobin 12.2 (L) 13.3 - 17.7 g/dL    Hematocrit 40.9 40.0 - 53.0 %     (H) 78 - 100 fL    MCH 33.2 (H) 26.5 - 33.0 pg    MCHC 29.8 (L) 31.5 - 36.5 g/dL    RDW 16.4 (H) 10.0 - 15.0 %    Platelet Count 210 150 - 450 10e3/uL    % Neutrophils 43 %    % Lymphocytes 39 %    % Monocytes 9 %    % Eosinophils 4 %    % Basophils 1 %    % Immature Granulocytes 4 %    NRBCs per 100 WBC 0 <1 /100    Absolute Neutrophils 2.6 1.6 - 8.3 10e3/uL    Absolute Lymphocytes 2.4 0.8 - 5.3 10e3/uL    Absolute Monocytes 0.5 0.0 - 1.3 10e3/uL    Absolute Eosinophils 0.3 0.0 - 0.7 10e3/uL    Absolute Basophils 0.1 0.0 - 0.2 10e3/uL    Absolute Immature Granulocytes 0.3 <=0.4 10e3/uL    Absolute NRBCs 0.0 10e3/uL    Symptomatic; Unknown Influenza A/B & SARS-CoV2 (COVID-19) Virus PCR Multiplex Nose    Specimen: Nose; Swab   Result Value Ref Range    Influenza A PCR Negative Negative    Influenza B PCR Negative Negative    RSV PCR Negative Negative    SARS CoV2 PCR Negative Negative    Narrative    Testing was performed using the Xpert Xpress CoV2/Flu/RSV Assay on the [employer] GeneXpert Instrument. This test should be ordered for the detection of SARS-CoV-2 and influenza viruses in individuals who meet clinical and/or epidemiological criteria. Test performance is unknown in asymptomatic patients. This test is for in vitro diagnostic use under the FDA EUA for laboratories certified under CLIA to perform high or moderate complexity testing. This test has not been FDA cleared or approved. A negative result does not rule out the presence of PCR inhibitors in the specimen or target RNA in concentration below the limit of detection for the assay. If only one viral target is positive but coinfection with multiple targets is suspected, the sample should be re-tested with another FDA cleared, approved, or authorized test, if coinfection would change clinical management. This test was validated by the [facility]. These laboratories are certified under the Clinical  Laboratory Improvement Amendments of 1988 (CLIA-88) as qualified to perform high complexity laboratory testing.   XR Chest Port 1 View    Narrative    PROCEDURE:  XR CHEST PORT 1 VIEW    HISTORY:  cardiopulmonary arrest, intubate, OG in place- line  placement, chest eval.     COMPARISON:  None.    FINDINGS:   There is an endotracheal tube with its tip approximately 6.5 cm above  the kalyani. There is a nasogastric tube passing into the stomach.  Heart is normal in size. Widespread interstitial thickening is noted.  Costophrenic angles are sharp.      Impression    IMPRESSION:  Widespread interstitial thickening    MARISA ARRINGTON MD         SYSTEM ID:   [id number]   Blood gas arterial and oxyhgb   Result Value Ref Range    pH Arterial 7.13 (LL) 7.35 - 7.45    pCO2 Arterial 54 (H) 35 - 45 mm Hg    pO2 Arterial >488 (H) 80 - 105 mm Hg    Bicarbonate Arterial 18 (L) 21 - 28 mmol/L    Oxyhemoglobin Arterial 96 92 - 100 %    Base Excess/Deficit (+/-) -11.0 (L) -9.0 - 1.8 mmol/L    FIO2 100        Medications   EPINEPHrine (ADRENALINE) 5 mg in  mL infusion (PERIPHERAL) (0.03 mcg/kg/min × 57.2 kg Intravenous New Bag 5/30/22 1047)   ketamine (KETALAR) injection 80 mg (has no administration in time range)   rocuronium injection 50 mg (has no administration in time range)       Assessments & Plan (with Medical Decision Making)  Blake Burgos is a 82 year old male here by ambulance in full CP arrest. He had an unwitnessed collapse in the parking lot of the [employer] in [place]. He was unresponsive.  Per EMS: First Responders initiated CPR on scene. EMS arrived and intubated the patient, started two IV's, started the Matty for asystole (no shock) and gave a total of two doses of epinephrine and an amp of calcium bicarb.  He arrived in the ED with the Matty running and he was being bagged. He has not had any purposeful movement during transport.  Her daughter and son in law, who came to the ED: he would accept CPR but if recovery is unlikely he would not want to live on a ventilator.  He is vaccinated, not boosted, against COVID. He is not on blood thinners.  He has a history of COPD, asbestosis (on albuterol, Advair, Spiriva), HTN (on metoprolol) and is taking 81 mg aspirin and Lipitor. I do not see blood thinners on his chart.  Patient was seen in [facility] on arrival. Matty running, intubated, has had two doses of epinephrine and an amp of bicarb.  We did call for helicopter transport.  We gave two more doses of epinephrine before we got ROSC at 10:39 AM.  Matty was removed, OG placed and chest xray done.  This shows lines in good position, lungs are  abnormal consistent with known asbestosis, radiology read includes comment about wide mediastinum. I spoke with Dr Goss at [facility] in [place] and they would like to know if EKG shows STEMI. They also want to know if family is aware of any resuscitation wishes for Joanne Jonesey was placed and shows cloudy urine.  Labs show CBC with hgb 12.2, BMP with Cr 1.92 (has stage 3 CKD) Ca 7.9 (given Ca bicarb by EMS), glucose 217, INR 1.18, aPTT 35, D dimer 7.77 (getting non-contrast chest CT due to GFR 34), ethanol zero, lactic acid 8.9, troponin 33.7, ABG with pH 7.13, pCO2 54, pO2 >488, bicarb 18, 4 Plex pending. UA pending. EKG shows sinus rhythm with RBBB.  Chest CT without contrast looks okay to me, head CT ok to me, C spine CT ok to me (formal CT reports pending).   Results not back at the time he left our ED: UA, 4 Plex for COVID, CT reports  11:38 AM   I spoke with Dr Goss again about this patient and the patient will go to  ICU.      I have reviewed the nursing notes.    Final diagnoses:   Cardiopulmonary arrest (H)   Chronic obstructive pulmonary disease, unspecified COPD type (H)   H/O asbestosis   Stage 3b chronic kidney disease (H)       5/30/2022   [facility] AND [facility], Donnell Sears MD  05/30/22 3887

## 2022-06-01 NOTE — RESULT ENCOUNTER NOTE
"Final urine culture report shows \"NO GROWTH\" and is NEGATIVE.  [facility] Emergency Dept discharge antibiotic: None  Recommendations in treatment per [facility] ED Lab result Urine culture protocol.  "